# Patient Record
Sex: FEMALE | Race: BLACK OR AFRICAN AMERICAN | NOT HISPANIC OR LATINO | Employment: UNEMPLOYED | URBAN - METROPOLITAN AREA
[De-identification: names, ages, dates, MRNs, and addresses within clinical notes are randomized per-mention and may not be internally consistent; named-entity substitution may affect disease eponyms.]

---

## 2022-06-18 ENCOUNTER — HOSPITAL ENCOUNTER (EMERGENCY)
Facility: HOSPITAL | Age: 37
Discharge: HOME | End: 2022-06-18
Attending: EMERGENCY MEDICINE

## 2022-06-18 VITALS
DIASTOLIC BLOOD PRESSURE: 77 MMHG | BODY MASS INDEX: 30.82 KG/M2 | RESPIRATION RATE: 16 BRPM | TEMPERATURE: 98 F | HEIGHT: 65 IN | WEIGHT: 185 LBS | HEART RATE: 61 BPM | OXYGEN SATURATION: 98 % | SYSTOLIC BLOOD PRESSURE: 125 MMHG

## 2022-06-18 DIAGNOSIS — H92.02 OTALGIA OF LEFT EAR: Primary | ICD-10-CM

## 2022-06-18 PROCEDURE — 25000003 PHARM REV CODE 250: Performed by: EMERGENCY MEDICINE

## 2022-06-18 PROCEDURE — 99283 EMERGENCY DEPT VISIT LOW MDM: CPT | Mod: 25

## 2022-06-18 RX ORDER — ACETAMINOPHEN 500 MG
1000 TABLET ORAL
Status: COMPLETED | OUTPATIENT
Start: 2022-06-18 | End: 2022-06-18

## 2022-06-18 RX ADMIN — ACETAMINOPHEN 1000 MG: 500 TABLET ORAL at 05:06

## 2022-06-18 NOTE — ED TRIAGE NOTES
Patient brought in by EMS from home. States left ear started hurting tonight. States she feels like something could be in ear. Pain rated 4/10 on pain scale.    Review of patient's allergies indicates:  No Known Allergies     Patient has verified the spelling of their name and  on armband.   APPEARANCE: Patient is alert, calm, oriented x 4, and does not appear distressed.  SKIN: Skin is normal for race, warm, and dry. Normal skin turgor and mucous membranes moist.  ENT: EARS: no obvious drainage. Left ear pain NOSE: no active bleeding. THROAT: no redness or swelling.

## 2022-06-18 NOTE — ED PROVIDER NOTES
Encounter Date: 6/18/2022       History     Chief Complaint   Patient presents with    Otalgia     Pt states she began experiencing a weird feeling in her ear as if something were inside. Pt denies trying to remove anything in her ear.       37-year-old female presents the ER for evaluation of left ear pain.  She reports she woke up with an abnormal sensation as if something was not sided.  Called EMS and came to the ER for further evaluation.        Review of patient's allergies indicates:  No Known Allergies  No past medical history on file.  No past surgical history on file.  No family history on file.     Review of Systems   HENT: Positive for ear pain.    All other systems reviewed and are negative.      Physical Exam     Initial Vitals [06/18/22 0415]   BP Pulse Resp Temp SpO2   125/77 61 16 98 °F (36.7 °C) 98 %      MAP       --         Physical Exam    Constitutional: She appears well-developed and well-nourished.   HENT:   Head: Normocephalic and atraumatic.   Right Ear: External ear normal.   Left Ear: External ear normal.   Bilateral tympanic membranes clear without any foreign body           ED Course   Procedures  Labs Reviewed - No data to display       Imaging Results    None          Medications   acetaminophen tablet 1,000 mg (1,000 mg Oral Given 6/18/22 0525)     Medical Decision Making:   Initial Assessment:     37-year-old female presents the ER for evaluation of foreign body sensation in left ear.  Tympanic membrane exam revealed no foreign body.  Advised patient Tylenol Motrin will be discharged.  Differential Diagnosis:   Otitis media, foreign body, eustachian tube pain                      Clinical Impression:   Final diagnoses:  [H92.02] Otalgia of left ear (Primary)          ED Disposition Condition    Discharge Stable        ED Prescriptions     None        Follow-up Information     Follow up With Specialties Details Why Contact Info Additional Information    [facility]ent[facility] Penaloza [facility]  Family Medicine Schedule an appointment as soon as possible for a visit  As needed [address], [address]  [facility] [address] [phone] Please park in [address] or [address] and use [address]ann roldan. Take Medical Office Bldg. elevators.           Adalgisa Dutton MD  06/18/22 [id number]